# Patient Record
Sex: FEMALE | Race: WHITE | NOT HISPANIC OR LATINO | Employment: STUDENT | URBAN - METROPOLITAN AREA
[De-identification: names, ages, dates, MRNs, and addresses within clinical notes are randomized per-mention and may not be internally consistent; named-entity substitution may affect disease eponyms.]

---
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
## 2024-11-29 ENCOUNTER — NURSE TRIAGE (OUTPATIENT)
Dept: ADMINISTRATIVE | Facility: CLINIC | Age: 24
End: 2024-11-29
Payer: COMMERCIAL

## 2024-11-29 ENCOUNTER — OFFICE VISIT (OUTPATIENT)
Dept: URGENT CARE | Facility: CLINIC | Age: 24
End: 2024-11-29
Payer: COMMERCIAL

## 2024-11-29 VITALS
DIASTOLIC BLOOD PRESSURE: 75 MMHG | HEIGHT: 62 IN | HEART RATE: 75 BPM | OXYGEN SATURATION: 97 % | SYSTOLIC BLOOD PRESSURE: 105 MMHG | TEMPERATURE: 98 F | BODY MASS INDEX: 20.24 KG/M2 | RESPIRATION RATE: 18 BRPM | WEIGHT: 110 LBS

## 2024-11-29 DIAGNOSIS — N72 CERVICITIS: ICD-10-CM

## 2024-11-29 DIAGNOSIS — R10.30 LOWER ABDOMINAL PAIN: Primary | ICD-10-CM

## 2024-11-29 LAB
B-HCG UR QL: NEGATIVE
BILIRUBIN, UA POC OHS: NEGATIVE
BLOOD, UA POC OHS: NEGATIVE
CLARITY, UA POC OHS: CLEAR
COLOR, UA POC OHS: YELLOW
CTP QC/QA: YES
GLUCOSE, UA POC OHS: NEGATIVE
KETONES, UA POC OHS: NEGATIVE
LEUKOCYTES, UA POC OHS: NEGATIVE
NITRITE, UA POC OHS: NEGATIVE
PH, UA POC OHS: 7
PROTEIN, UA POC OHS: NEGATIVE
SPECIFIC GRAVITY, UA POC OHS: 1.02
UROBILINOGEN, UA POC OHS: 0.2

## 2024-11-29 PROCEDURE — 87086 URINE CULTURE/COLONY COUNT: CPT | Performed by: FAMILY MEDICINE

## 2024-11-29 PROCEDURE — 87491 CHLMYD TRACH DNA AMP PROBE: CPT | Performed by: FAMILY MEDICINE

## 2024-11-29 PROCEDURE — 0352U VAGINOSIS SCREEN BY DNA PROBE: CPT | Performed by: FAMILY MEDICINE

## 2024-11-29 RX ORDER — DOXYCYCLINE HYCLATE 100 MG
100 TABLET ORAL 2 TIMES DAILY
Qty: 14 TABLET | Refills: 0 | Status: SHIPPED | OUTPATIENT
Start: 2024-11-29 | End: 2024-12-06

## 2024-11-29 RX ORDER — CEFTRIAXONE 500 MG/1
500 INJECTION, POWDER, FOR SOLUTION INTRAMUSCULAR; INTRAVENOUS
Status: COMPLETED | OUTPATIENT
Start: 2024-11-29 | End: 2024-11-29

## 2024-11-29 RX ORDER — LIDOCAINE HYDROCHLORIDE 10 MG/ML
1 INJECTION, SOLUTION INFILTRATION; PERINEURAL
Status: COMPLETED | OUTPATIENT
Start: 2024-11-29 | End: 2024-11-29

## 2024-11-29 RX ADMIN — LIDOCAINE HYDROCHLORIDE 1 ML: 10 INJECTION, SOLUTION INFILTRATION; PERINEURAL at 12:11

## 2024-11-29 RX ADMIN — CEFTRIAXONE 500 MG: 500 INJECTION, POWDER, FOR SOLUTION INTRAMUSCULAR; INTRAVENOUS at 12:11

## 2024-11-29 NOTE — TELEPHONE ENCOUNTER
Patient c/o abdominal pain x 3 days. She was advised per protocol to be seen in the office today. She would like to be seen by GI if possible. Will send a note to GI to f/u with the patient. Advised the patient to call back with any further questions or if symptoms worsen.        Reason for Disposition   MODERATE pain (e.g., interferes with normal activities that comes and goes (cramps) lasts > 24 hours  (Exception: Pain with Vomiting or Diarrhea - see that Protocol.)    Additional Information   Negative: Passed out (e.g., fainted, lost consciousness, blacked out and was not responding)   Negative: Shock suspected (e.g., cold/pale/clammy skin, too weak to stand, low BP, rapid pulse)   Negative: Sounds like a life-threatening emergency to the triager   Negative: SEVERE abdominal pain (e.g., excruciating)   Negative: Vomiting red blood or black (coffee ground) material   Negative: Blood in bowel movements  (Exception: Blood on surface of BM with constipation.)   Negative: Black or tarry bowel movements  (Exception: Chronic-unchanged black-grey BMs AND is taking iron pills or Pepto-Bismol.)   Negative: MILD TO MODERATE constant pain lasting > 2 hours   Negative: MILD TO MODERATE constant pain lasting > 2 hours, and age > 60 years   Negative: Vomiting bile (green color)   Negative: Patient sounds very sick or weak to the triager   Negative: Vomiting and abdomen looks much more swollen than usual   Negative: White of the eyes have turned yellow (i.e., jaundice)   Negative: Blood in urine (red, pink, or tea-colored)   Negative: Fever > 103 F (39.4 C)   Negative: Fever > 101 F (38.3 C) and over 60 years of age   Negative: Fever > 100 F (37.8 C) and has diabetes mellitus or a weak immune system (e.g., HIV positive, cancer chemotherapy, organ transplant, splenectomy, chronic steroids)   Negative: Fever > 100 F (37.8 C) and bedridden (e.g., CVA, chronic illness, recovering from surgery)    Protocols used: Abdominal Pain -  Female-A-[place]

## 2024-11-29 NOTE — PATIENT INSTRUCTIONS
Your urine test looked ok and your urine pregnancy test was negative (normal).     Your cervix was a bit inflamed on exam and so the protocol is to treat for possible infection with 2 antibiotics (rocephin-- in clinic) and oral doxycycline at home.    I recommend abstaining from sexual intercourse for a week and at least until your test results return that will assess for Sexually transmitted infections (gonorrhea/chlamydia and trichomonas) as well as non - Sexually transmitted infections, BV and yeast.     Also stick to a bland diet     If you experience any worsening or worrisome symptoms (fever, abdominal pain worsening,etc), please go to the nearest emergency department for a stat evaluation which may include blood work and imaging at that point.     Otherwise, if your symptoms are mild and persisting, I do recommend that you follow up with a gynecologist and/or gastroenterologist. (Referrals placed). You can call our  line at [phone] and they can assist you in making this specialist appointment.

## 2024-11-29 NOTE — PROGRESS NOTES
"Subjective:      Patient ID: Ariadna Nassar is a 23 y.o. female.    Vitals:  height is 5' 2" (1.575 m) and weight is 49.9 kg (110 lb). Her temperature is 98.3 °F (36.8 °C). Her blood pressure is 105/75 and her pulse is 75. Her respiration is 18 and oxygen saturation is 97%.     Chief Complaint: Abdominal Pain    23 y.o female presents in clinic today with crampy  Lower abdominal pain, b/l,  with the onset that began 2.5 days ago. Pt report the fluctuating from 3/10 to 8 /10. Last 8/10 yesterday, currently 2/10. Pt denies any dysuria or hematuria or abnormal vaginal discharge.  No diarrhea or vomiting.  A few days ago she did have a few stools back to back that were normal in consistency and this is after she drink caffeinated teas so she attributed to the caffeine.  She typically does not taken caffeine.  She was a remote history of GERD but no GERD like symptoms recently.  No vomiting.  No fever no sick contacts.  She was sexually active.  LMP about a week ago.  Normal menses and normal menses One month prior to that.  Sexual partner without any known symptoms.  Patient without any history of STIs.    Abdominal Pain  This is a new problem. The current episode started in the past 7 days. The onset quality is sudden. The pain is located in the LLQ and RLQ. The pain is at a severity of 8/10. The pain is moderate. The quality of the pain is aching. The abdominal pain does not radiate. Pertinent negatives include no constipation, diarrhea, dysuria, frequency, headaches, hematuria, nausea or vomiting. She has tried acetaminophen (heating pad) for the symptoms.       Gastrointestinal:  Positive for abdominal pain. Negative for nausea, vomiting, constipation and diarrhea.   Genitourinary:  Negative for dysuria, frequency and hematuria.   Neurological:  Negative for headaches.      Objective:     Physical Exam  Constitutional: Pt oriented to person, place, and time.  Non-toxic appearance.   Patient does not appear ill. No " distress. normal  HENT: No icterus or facial swelling appreciated  Head: Normocephalic and atraumatic.   Nose: No congestion.   Pulmonary/Chest: Effort normal. No stridor. No respiratory distress.   Abdominal: Normal appearance. Abdomen exhibits no distension.  No rebound or guarding.  Nontender all 4 quadrants and suprapubic area.    GYN:  No external genital rashes or lesions.  Speculum exam revealed slight friable cervix with small amount of light yellow discharge in the vaginal vault.  Os closed.  Mild CMT  Musculoskeletal:         General: No swelling.   Neurological: no focal deficit. Patient is alert and oriented to person, place, and time.   Skin: Skin is not diaphoretic and not pale. no jaundice  Psychiatric: Patients behavior is normal. Mood, judgment and thought content normal.     Assessment:     1. Lower abdominal pain    2. Cervicitis        Plan:       Lower abdominal pain  -     POCT Urinalysis(Instrument)  -     POCT urine pregnancy  -     Vaginosis Screen by DNA Probe  -     C. trachomatis/N. gonorrhoeae by AMP DNA  -     Ambulatory referral/consult to Gastroenterology  -attempted to make appointment for patient to complete outpatient stat/ASAP ultrasound abdomen and pelvis however there were no appointment times available for at least 5 days so deferred.      She is to follow up with PCP/gyn or GI symptoms are persisting and she was given strict ED precautions for any worsening or worrisome symptoms.         Cancel: US Abdomen Complete  -     Cancel: US Pelvis Complete Non OB; Future; Expected date: 11/29/2024    -     Urine culture  -     cefTRIAXone injection 500 mg  -     Ambulatory referral/consult to Gynecology    Cervicitis  -     cefTRIAXone injection 500 mg  -     doxycycline (VIBRA-TABS) 100 MG tablet; Take 1 tablet (100 mg total) by mouth 2 (two) times daily. for 7 days  Dispense: 14 tablet; Refill: 0  -     LIDOcaine HCL 10 mg/ml (1%) injection 1 mL  -     Ambulatory referral/consult to  Gynecology      Patient Instructions   Your urine test looked ok and your urine pregnancy test was negative (normal).     Your cervix was a bit inflamed on exam and so the protocol is to treat for possible infection with 2 antibiotics (rocephin-- in clinic) and oral doxycycline at home.    I recommend abstaining from sexual intercourse for a week and at least until your test results return that will assess for Sexually transmitted infections (gonorrhea/chlamydia and trichomonas) as well as non - Sexually transmitted infections, BV and yeast.     Also stick to a bland diet     If you experience any worsening or worrisome symptoms (fever, abdominal pain worsening,etc), please go to the nearest emergency department for a stat evaluation which may include blood work and imaging at that point.     Otherwise, if your symptoms are mild and persisting, I do recommend that you follow up with a gynecologist and/or gastroenterologist. (Referrals placed). You can call our  line at [phone] and they can assist you in making this specialist appointment.

## 2024-11-30 LAB — BACTERIA UR CULT: NORMAL

## 2024-12-16 ENCOUNTER — OFFICE VISIT (OUTPATIENT)
Dept: GASTROENTEROLOGY | Facility: CLINIC | Age: 24
End: 2024-12-16
Payer: COMMERCIAL

## 2024-12-16 ENCOUNTER — LAB VISIT (OUTPATIENT)
Dept: LAB | Facility: HOSPITAL | Age: 24
End: 2024-12-16
Payer: COMMERCIAL

## 2024-12-16 VITALS
SYSTOLIC BLOOD PRESSURE: 126 MMHG | WEIGHT: 119.94 LBS | HEART RATE: 85 BPM | BODY MASS INDEX: 22.07 KG/M2 | HEIGHT: 62 IN | DIASTOLIC BLOOD PRESSURE: 74 MMHG

## 2024-12-16 DIAGNOSIS — R10.30 LOWER ABDOMINAL PAIN: Primary | ICD-10-CM

## 2024-12-16 DIAGNOSIS — R10.30 LOWER ABDOMINAL PAIN: ICD-10-CM

## 2024-12-16 LAB
ALBUMIN SERPL BCP-MCNC: 4.1 G/DL (ref 3.5–5.2)
ALP SERPL-CCNC: 51 U/L (ref 40–150)
ALT SERPL W/O P-5'-P-CCNC: 14 U/L (ref 10–44)
ANION GAP SERPL CALC-SCNC: 10 MMOL/L (ref 8–16)
AST SERPL-CCNC: 17 U/L (ref 10–40)
BASOPHILS # BLD AUTO: 0.03 K/UL (ref 0–0.2)
BASOPHILS NFR BLD: 0.5 % (ref 0–1.9)
BILIRUB SERPL-MCNC: 0.4 MG/DL (ref 0.1–1)
BUN SERPL-MCNC: 11 MG/DL (ref 6–20)
CALCIUM SERPL-MCNC: 9.2 MG/DL (ref 8.7–10.5)
CHLORIDE SERPL-SCNC: 108 MMOL/L (ref 95–110)
CO2 SERPL-SCNC: 25 MMOL/L (ref 23–29)
CREAT SERPL-MCNC: 0.9 MG/DL (ref 0.5–1.4)
DIFFERENTIAL METHOD BLD: ABNORMAL
EOSINOPHIL # BLD AUTO: 0.1 K/UL (ref 0–0.5)
EOSINOPHIL NFR BLD: 1.9 % (ref 0–8)
ERYTHROCYTE [DISTWIDTH] IN BLOOD BY AUTOMATED COUNT: 12.1 % (ref 11.5–14.5)
EST. GFR  (NO RACE VARIABLE): >60 ML/MIN/1.73 M^2
GLUCOSE SERPL-MCNC: 84 MG/DL (ref 70–110)
HCT VFR BLD AUTO: 41.5 % (ref 37–48.5)
HGB BLD-MCNC: 13.7 G/DL (ref 12–16)
IMM GRANULOCYTES # BLD AUTO: 0.01 K/UL (ref 0–0.04)
IMM GRANULOCYTES NFR BLD AUTO: 0.2 % (ref 0–0.5)
LYMPHOCYTES # BLD AUTO: 2 K/UL (ref 1–4.8)
LYMPHOCYTES NFR BLD: 34.7 % (ref 18–48)
MCH RBC QN AUTO: 30.9 PG (ref 27–31)
MCHC RBC AUTO-ENTMCNC: 33 G/DL (ref 32–36)
MCV RBC AUTO: 94 FL (ref 82–98)
MONOCYTES # BLD AUTO: 0.4 K/UL (ref 0.3–1)
MONOCYTES NFR BLD: 7.6 % (ref 4–15)
NEUTROPHILS # BLD AUTO: 3.1 K/UL (ref 1.8–7.7)
NEUTROPHILS NFR BLD: 55.1 % (ref 38–73)
NRBC BLD-RTO: 0 /100 WBC
PLATELET # BLD AUTO: 273 K/UL (ref 150–450)
PMV BLD AUTO: 8.9 FL (ref 9.2–12.9)
POTASSIUM SERPL-SCNC: 3.7 MMOL/L (ref 3.5–5.1)
PROT SERPL-MCNC: 7.3 G/DL (ref 6–8.4)
RBC # BLD AUTO: 4.43 M/UL (ref 4–5.4)
SODIUM SERPL-SCNC: 143 MMOL/L (ref 136–145)
WBC # BLD AUTO: 5.65 K/UL (ref 3.9–12.7)

## 2024-12-16 PROCEDURE — 36415 COLL VENOUS BLD VENIPUNCTURE: CPT

## 2024-12-16 PROCEDURE — 1159F MED LIST DOCD IN RCRD: CPT | Mod: CPTII,S$GLB,,

## 2024-12-16 PROCEDURE — 99204 OFFICE O/P NEW MOD 45 MIN: CPT | Mod: S$GLB,,,

## 2024-12-16 PROCEDURE — 85025 COMPLETE CBC W/AUTO DIFF WBC: CPT

## 2024-12-16 PROCEDURE — 3078F DIAST BP <80 MM HG: CPT | Mod: CPTII,S$GLB,,

## 2024-12-16 PROCEDURE — 80053 COMPREHEN METABOLIC PANEL: CPT

## 2024-12-16 PROCEDURE — 99999 PR PBB SHADOW E&M-EST. PATIENT-LVL III: CPT | Mod: PBBFAC,,,

## 2024-12-16 PROCEDURE — 3074F SYST BP LT 130 MM HG: CPT | Mod: CPTII,S$GLB,,

## 2024-12-16 PROCEDURE — 3008F BODY MASS INDEX DOCD: CPT | Mod: CPTII,S$GLB,,

## 2024-12-16 NOTE — PROGRESS NOTES
[facility] NOTE    Name: Ariadna Nassar  : 2000  Date of Service: 2024   PCP: No, Primary Doctor    Reason for visit:   Chief Complaint   Patient presents with    Abdominal Pain       HPI:     The patient is a 24 year old female with no significant PMH who presents to GI clinic for evaluation of crampy lower abdominal pain. She reports that for two weeks in 2024 she had constant feeling of needing to defecate but when she would go to the restroom nothing came out and the feeling would resolve and come back shortly after. She didn't pass stool, mucus or blood, nor gas. She said this was painless but bothersome. These symptoms resolved and then she had a three day episode just prior to Thanksgiving of crampy bilateral lower abdominal pain. This was approximately a week after completion of her menstrual cycle bleeding. She does endorse painful menses but not usually after the bleeding is over, more at the start. She doesn't take any medications. She went to urgent care with the pain and they performed a pelvic exam and got pregnancy test and ruled out vaginal infections, all of which was normal. She was given a short course of prophylactic abx but says she didn't take them because they werent indicated. She denies nausea or vomiting during this time but said for the three days of pain she had no appetite. This all resolved as acutely as it came on three days later and has not recurred since. She doesn't use ibuprofen regularly, is a a nonsmoker, doesn't drink, denies melena, hematochezia, and weight loss. No family history of pelvic or colon cancers.     Most Recent Upper Endoscopy:   N/a    Most Recent Colonoscopy:   N/a    Review of Systems:   Review of Systems   Constitutional:  Negative for chills and fever.   Gastrointestinal:  Negative for abdominal pain, blood in stool, constipation, diarrhea, heartburn, melena, nausea and vomiting.   Genitourinary:  Negative for  "dysuria, frequency and urgency.     Medications: No current outpatient medications on file.     Past medical history, past surgical history, family history, allergies, and social history reviewed and updated in EMR.     Vitals:   Vitals:    12/16/24 1428   BP: 126/74   Pulse: 85   Weight: 54.4 kg (119 lb 14.9 oz)   Height: 5' 2" (1.575 m)     Body mass index is 21.94 kg/m².    Physical Exam:   Physical Exam  Constitutional:       General: She is not in acute distress.  HENT:      Head: Normocephalic and atraumatic.      Nose: Nose normal.      Mouth/Throat:      Mouth: Mucous membranes are dry.      Pharynx: Oropharynx is clear.   Eyes:      Extraocular Movements: Extraocular movements intact.      Conjunctiva/sclera: Conjunctivae normal.   Pulmonary:      Effort: Pulmonary effort is normal.   Abdominal:      General: Abdomen is flat. There is no distension.      Palpations: Abdomen is soft.      Tenderness: There is no abdominal tenderness. There is no guarding.   Neurological:      General: No focal deficit present.      Mental Status: She is alert and oriented to person, place, and time.     Assessment:   1. Lower abdominal pain  CBC W/ AUTO DIFFERENTIAL    COMPREHENSIVE METABOLIC PANEL      24 year old female with no significant PMH presenting to GI clinic for crampy lower abdominal pain that has resolved.    Plan:   - CBC, CMP  - if pain recurs, patient will reach out and we will discuss US pelvis if similar pain versus possible colonoscopy depending on symptoms    Disposition: Follow-up PRN.    Discussed with staff attending. Attestation to follow.     Lashonda Xiao MD  Gastroenterology and Hepatology Fellow, PGY-4  Pager # [phone]    "

## 2024-12-19 ENCOUNTER — PATIENT MESSAGE (OUTPATIENT)
Dept: GASTROENTEROLOGY | Facility: CLINIC | Age: 24
End: 2024-12-19
Payer: COMMERCIAL